# Patient Record
Sex: FEMALE | Race: WHITE | Employment: OTHER | URBAN - METROPOLITAN AREA
[De-identification: names, ages, dates, MRNs, and addresses within clinical notes are randomized per-mention and may not be internally consistent; named-entity substitution may affect disease eponyms.]

---

## 2022-01-05 ENCOUNTER — APPOINTMENT (OUTPATIENT)
Dept: GENERAL RADIOLOGY | Age: 77
End: 2022-01-05
Attending: EMERGENCY MEDICINE
Payer: MEDICARE

## 2022-01-05 ENCOUNTER — APPOINTMENT (OUTPATIENT)
Dept: CT IMAGING | Age: 77
End: 2022-01-05
Attending: EMERGENCY MEDICINE
Payer: MEDICARE

## 2022-01-05 ENCOUNTER — HOSPITAL ENCOUNTER (EMERGENCY)
Age: 77
Discharge: HOME | End: 2022-01-05
Attending: EMERGENCY MEDICINE
Payer: MEDICARE

## 2022-01-05 VITALS
SYSTOLIC BLOOD PRESSURE: 157 MMHG | HEART RATE: 88 BPM | OXYGEN SATURATION: 97 % | DIASTOLIC BLOOD PRESSURE: 70 MMHG | TEMPERATURE: 98 F | WEIGHT: 92.59 LBS | RESPIRATION RATE: 14 BRPM

## 2022-01-05 DIAGNOSIS — S32.9XXA CLOSED NONDISPLACED FRACTURE OF PELVIS, UNSPECIFIED PART OF PELVIS, INITIAL ENCOUNTER (HCC): Primary | ICD-10-CM

## 2022-01-05 PROCEDURE — 73502 X-RAY EXAM HIP UNI 2-3 VIEWS: CPT

## 2022-01-05 PROCEDURE — 72192 CT PELVIS W/O DYE: CPT

## 2022-01-05 PROCEDURE — 99282 EMERGENCY DEPT VISIT SF MDM: CPT

## 2022-01-05 NOTE — ED TRIAGE NOTES
Pt arrives via wheelchair, reports she fell in front of washing machine. Pt does not recall if she tripped. Pt reports left hip pain. Unable to walk.

## 2022-01-05 NOTE — ED PROVIDER NOTES
HPI the patient fell earlier today and landed on her left hip. She complains of pain in the left hip and is unable to bear weight. She denies head/neck or trunk injury. No past medical history on file. No past surgical history on file. No family history on file. Social History     Socioeconomic History    Marital status: SINGLE     Spouse name: Not on file    Number of children: Not on file    Years of education: Not on file    Highest education level: Not on file   Occupational History    Not on file   Tobacco Use    Smoking status: Not on file    Smokeless tobacco: Not on file   Substance and Sexual Activity    Alcohol use: Not on file    Drug use: Not on file    Sexual activity: Not on file   Other Topics Concern    Not on file   Social History Narrative    Not on file     Social Determinants of Health     Financial Resource Strain:     Difficulty of Paying Living Expenses: Not on file   Food Insecurity:     Worried About Running Out of Food in the Last Year: Not on file    Luciana of Food in the Last Year: Not on file   Transportation Needs:     Lack of Transportation (Medical): Not on file    Lack of Transportation (Non-Medical):  Not on file   Physical Activity:     Days of Exercise per Week: Not on file    Minutes of Exercise per Session: Not on file   Stress:     Feeling of Stress : Not on file   Social Connections:     Frequency of Communication with Friends and Family: Not on file    Frequency of Social Gatherings with Friends and Family: Not on file    Attends Zoroastrianism Services: Not on file    Active Member of Clubs or Organizations: Not on file    Attends Club or Organization Meetings: Not on file    Marital Status: Not on file   Intimate Partner Violence:     Fear of Current or Ex-Partner: Not on file    Emotionally Abused: Not on file    Physically Abused: Not on file    Sexually Abused: Not on file   Housing Stability:     Unable to Pay for Housing in the Last Year: Not on file    Number of Places Lived in the Last Year: Not on file    Unstable Housing in the Last Year: Not on file         ALLERGIES: Patient has no known allergies. Review of Systems   Constitutional: Negative for fever. HENT: Negative for voice change. Eyes: Negative for visual disturbance. Respiratory: Negative for shortness of breath. Cardiovascular: Negative for chest pain. Gastrointestinal: Negative for abdominal pain. Genitourinary: Negative for difficulty urinating. Musculoskeletal: Positive for arthralgias. Neurological: Negative for headaches. Psychiatric/Behavioral: Negative for confusion. There were no vitals filed for this visit. Physical Exam  Constitutional:       General: She is not in acute distress. Appearance: She is well-developed. HENT:      Head: Normocephalic and atraumatic. Eyes:      Pupils: Pupils are equal, round, and reactive to light. Cardiovascular:      Rate and Rhythm: Normal rate. Heart sounds: No murmur heard. Pulmonary:      Effort: Pulmonary effort is normal.      Breath sounds: Normal breath sounds. Abdominal:      Palpations: Abdomen is soft. Tenderness: There is no abdominal tenderness. Musculoskeletal:      Cervical back: Normal range of motion. Comments: There is pain on range of motion of the left hip. The left leg is not shortened or externally rotated. Skin:     General: Skin is warm and dry. Capillary Refill: Capillary refill takes less than 2 seconds. Neurological:      Mental Status: She is alert. Psychiatric:         Behavior: Behavior normal.          MDM       Procedures    5:20 PM--CT shows a nondisplaced superior ramus fracture. The patient was able to stand with assistance. The family would like to take the patient home and there are people at home that can help with her ambulation.   The plan is to discharge the patient and have her follow-up with orthopedics in 3 to 4 days.

## 2022-01-17 ENCOUNTER — HOSPITAL ENCOUNTER (EMERGENCY)
Age: 77
Discharge: HOME | End: 2022-01-17
Attending: STUDENT IN AN ORGANIZED HEALTH CARE EDUCATION/TRAINING PROGRAM
Payer: MEDICARE

## 2022-01-17 VITALS
WEIGHT: 92 LBS | SYSTOLIC BLOOD PRESSURE: 119 MMHG | HEART RATE: 92 BPM | DIASTOLIC BLOOD PRESSURE: 61 MMHG | RESPIRATION RATE: 16 BRPM | OXYGEN SATURATION: 95 % | TEMPERATURE: 97.3 F

## 2022-01-17 DIAGNOSIS — S32.512A CLOSED FRACTURE OF SUPERIOR RAMUS OF LEFT PUBIS, INITIAL ENCOUNTER (HCC): Primary | ICD-10-CM

## 2022-01-17 PROCEDURE — 74011250637 HC RX REV CODE- 250/637: Performed by: STUDENT IN AN ORGANIZED HEALTH CARE EDUCATION/TRAINING PROGRAM

## 2022-01-17 PROCEDURE — 99282 EMERGENCY DEPT VISIT SF MDM: CPT

## 2022-01-17 RX ORDER — ACETAMINOPHEN AND CODEINE PHOSPHATE 300; 30 MG/1; MG/1
1 TABLET ORAL
Qty: 12 TABLET | Refills: 0 | Status: SHIPPED | OUTPATIENT
Start: 2022-01-17 | End: 2022-01-20

## 2022-01-17 RX ORDER — ACETAMINOPHEN AND CODEINE PHOSPHATE 300; 30 MG/1; MG/1
1 TABLET ORAL
Status: COMPLETED | OUTPATIENT
Start: 2022-01-17 | End: 2022-01-17

## 2022-01-17 RX ADMIN — ACETAMINOPHEN AND CODEINE PHOSPHATE 1 TABLET: 300; 30 TABLET ORAL at 17:14

## 2022-01-17 NOTE — ED TRIAGE NOTES
Patient presents with a caregiver who reports the patient fell on 1/5 and was evaluated at that time and diagnosed with a superior ramus fracture-     Caregiver reports they were unable to get an ortho appointment and the patient has ongoing pain so they present to the ED for another evaluation-

## 2022-01-17 NOTE — DISCHARGE INSTRUCTIONS
- I spoke with Dr. Jillian Russell today, the on call orthopedic surgeon. He confirmed that you should be able to schedule an appointment with Dr. Abdifatah Bergman (also at [address] [place] [address]) to be seen in the office. Please let the  know that they MUST schedule you for an urgent appointment and that this conversation occurred.   - Continue Tylenol with codeine as needed for pain  - Return for severe pain, any new or worsening symptoms

## 2022-01-17 NOTE — PROGRESS NOTES
CARE MANAGEMENT NOTE      CM was asked to see Pt re: concerns over Pt's inability to ambulate. Pt was seen at [facility] ER on 1/5/22 for closed fracture of superior ramus of left pubis. Pt was unable to be seen by Ortho and was told to bring Pt back to ER due to pain. CM met with Pt and her caregiver/friend, Cindy. Pt has dementia so Pt's interaction is limited. CM discussed [facility] services. Cindy is agreeable. There is no preference for [facility] agency. CM also asked if Pt had a w/c at home. Cindy states they do not but she has been using a rollator to get Pt around. Cindy interested in a skinny w/c to help get Pt in/out of bathroom. CM discussed transport chair. Cindy agrees that sounds what they needs. CM informed her this would be an out of pocket purchase. She is agreeable. CM directed her to [facility] or online order from [employer]. CM provided Cindy examples from [employer] for reference. Plan is for f/u with [facility]. CM also provided information for [facility] in [facility] if problem persists. Cindy voiced understanding. JOSE spoke with Luaren Montiel at [facility] re: referral. Lauren Montiel confirms they are able to accept (they service Pt's location and accept Pt's insurance). Referral sent to Lauren Montiel via [employer] that included medicals and orders. Lauren Montiel confirms SN will plan to open tomorrow and PT can open on Wednesday. JOSE met with Cindy again. Cindy is agreeable to [facility]. She reports that she has an MRI scheduled for tomorrow (9:30am) but she would be available after that.  All information added to AVS.    Provider updated.     _____________________________________  MICHELE Rivera - Care Management  1/17/2022   6:11 PM

## 2022-01-17 NOTE — ED PROVIDER NOTES
Chief Complaint   Patient presents with    Back Pain     History and review of systems limited by dementia. Additional history was obtained by the patient's family member. This is a 62-year-old female with dementia brought in by family for persistent right hip and right lower back pain following a ground level fall that occurred on 2021. She was seen at [facility] at the time and had a CT scan confirming a left superior pubic ramus fracture. She was able to bear weight at the time and wished to go home, declined any analgesics, however over the last several days has been in severe pain and unable to walk. Prior to the injury she was ambulatory without assistance. Leona Giffordos today and was told to come to the emergency department to be evaluated further as the practice was not able to see her. She has no other systemic complaints. Denies any chest pain, shortness of breath, or abdominal pain. Does not take any anticoagulants. Past Medical History:   Diagnosis Date    Dementia [facility])        Past Surgical History:   Procedure Laterality Date    HX APPENDECTOMY           No family history on file.     Social History     Socioeconomic History    Marital status: SINGLE     Spouse name: Not on file    Number of children: Not on file    Years of education: Not on file    Highest education level: Not on file   Occupational History    Not on file   Tobacco Use    Smoking status: Former Smoker     Quit date: 2021     Years since quittin.4    Smokeless tobacco: Never Used   Substance and Sexual Activity    Alcohol use: Never    Drug use: Never    Sexual activity: Not on file   Other Topics Concern    Not on file   Social History Narrative    Not on file     Social Determinants of Health     Financial Resource Strain:     Difficulty of Paying Living Expenses: Not on file   Food Insecurity:     Worried About [address] in the Last Year: Not on file    Luciana of Food in the Last Year: Not on file   Transportation Needs:     Lack of Transportation (Medical): Not on file    Lack of Transportation (Non-Medical): Not on file   Physical Activity:     Days of Exercise per Week: Not on file    Minutes of Exercise per Session: Not on file   Stress:     Feeling of Stress : Not on file   Social Connections:     Frequency of Communication with Friends and Family: Not on file    Frequency of Social Gatherings with Friends and Family: Not on file    Attends Judaism Services: Not on file    Active Member of [address] [place] [address] [employer] or Organizations: Not on file    Attends Club or Organization Meetings: Not on file    Marital Status: Not on file   Intimate Partner Violence:     Fear of Current or Ex-Partner: Not on file    Emotionally Abused: Not on file    Physically Abused: Not on file    Sexually Abused: Not on file   Housing Stability:     Unable to Pay for Housing in the Last Year: Not on file    Number of [place] in the Last Year: Not on file    Unstable Housing in the Last Year: Not on file         ALLERGIES: Patient has no known allergies. Review of Systems   Unable to perform ROS: Dementia   Musculoskeletal: Positive for back pain.        Vitals:    01/17/22 1628   BP: 119/61   Pulse: 92   Resp: 16   Temp: 97.3 °F (36.3 °C)   SpO2: 95%   Weight: 41.7 kg (92 lb)            Physical Exam  General:  Awake and alert, NAD  HEENT:  NC/AT, equal pupils, moist mucous membranes  Neck:   Normal inspection, full range of motion  Cardiac:  RRR, no murmurs  Respiratory:  Clear bilaterally, no wheezes, rales, rhonchi  Abdomen:  Soft and nontender, nondistended  Pelvis:  Stable, tender to palpation across the right inferior lumbar region (acknowledge the injury is in the left hip, but she is tender across the right side of her lower back)  Extremities: Warm and well perfused, no peripheral edema  Neuro:  Moving all extremities symmetrically without gross motor deficit  Skin:   No rashes or pallor    RESULTS  No results found for this or any previous visit (from the past 12 hour(s)). IMAGING  No results found. Procedures - none unless documented below    ED course: Pain improved after receiving Tylenol with codeine. Case management was able to secure a transport wheelchair and home PT. She lives with her friend who feels comfortable bringing her home and bringing her to her appointment with orthopedics. I discussed her case with orthopedics Edelmira Jasso) as she was told earlier today by the  that she could not get an appointment, and he confirmed that someone from the practice can see her in the office. Will discharge home, I invited her to return should she develop any new or worsening symptoms or have difficulty functioning at home.     Impression: Left superior pubic ramus fracture  Disposition: Discharge home

## 2023-11-15 ENCOUNTER — HOSPITAL ENCOUNTER (OUTPATIENT)
Facility: HOSPITAL | Age: 78
Discharge: HOME | End: 2023-11-18
Payer: COMMERCIAL

## 2023-11-15 VITALS — WEIGHT: 92 LBS | BODY MASS INDEX: 15.71 KG/M2 | HEIGHT: 64 IN

## 2023-11-15 DIAGNOSIS — N63.11 LUMP IN UPPER OUTER QUADRANT OF RIGHT BREAST: ICD-10-CM

## 2023-11-15 PROCEDURE — 76642 ULTRASOUND BREAST LIMITED: CPT

## 2023-11-15 PROCEDURE — G0279 TOMOSYNTHESIS, MAMMO: HCPCS

## 2023-12-04 ENCOUNTER — APPOINTMENT (OUTPATIENT)
Facility: HOSPITAL | Age: 78
End: 2023-12-04
Payer: COMMERCIAL

## 2023-12-04 ENCOUNTER — HOSPITAL ENCOUNTER (OUTPATIENT)
Facility: HOSPITAL | Age: 78
Discharge: HOME | End: 2023-12-07
Payer: COMMERCIAL

## 2023-12-04 DIAGNOSIS — N63.12 MASS OF UPPER INNER QUADRANT OF RIGHT BREAST: ICD-10-CM

## 2023-12-04 PROCEDURE — 88305 TISSUE EXAM BY PATHOLOGIST: CPT

## 2023-12-04 PROCEDURE — 19083 BX BREAST 1ST LESION US IMAG: CPT

## 2023-12-04 NOTE — PROGRESS NOTES
9: 29 a.m. Patient received for right breast ultrasound-guided biopsy/breast clip. Procedure explained to patient and her Power Barrett WOODRUFF, as well as risks associated with procedure. Discharge instructions briefly discussed. Patient (and Power Barrett WOODRUFF) expect to be contacted by phone with pathology results. 9:45 a.m. A time-out was performed with all participants present. 10:30 a.m. Patient tolerated procedure well with minimal bleeding. Pressure held for 5 minutes; a dressing was applied to area (gauze and tegaderm). Discharge instructions reviewed with patient's Power of  and caretaker and a copy given to them. Patient, along with her Power of  and caretaker expect to be contacted by phone with pathology results.

## 2023-12-07 NOTE — PROGRESS NOTES
Pathology results in and reviewed by Dr Trudy Cisneros. Recommends additional biopsy sampling. Patient caretaker aware. Appointment scheduled.

## 2023-12-28 ENCOUNTER — HOSPITAL ENCOUNTER (OUTPATIENT)
Facility: HOSPITAL | Age: 78
End: 2023-12-28
Payer: COMMERCIAL

## 2023-12-28 DIAGNOSIS — N63.12 MASS OF UPPER INNER QUADRANT OF RIGHT BREAST: ICD-10-CM

## 2023-12-28 PROCEDURE — 19083 BX BREAST 1ST LESION US IMAG: CPT

## 2023-12-28 PROCEDURE — 88305 TISSUE EXAM BY PATHOLOGIST: CPT

## 2023-12-28 PROCEDURE — 88360 TUMOR IMMUNOHISTOCHEM/MANUAL: CPT

## 2023-12-28 PROCEDURE — 77065 DX MAMMO INCL CAD UNI: CPT

## 2023-12-28 NOTE — PROGRESS NOTES
Patient received for right breast ultrasound biopsy. Procedure explained to patient as well as risks associated with procedure. Patient accompanied by friend Ms Gomez Rigoberto who is her power of . Post biopsy discharge instructions reviewed with patient . Patient expects to be contacted by phone with pathology results.

## 2023-12-28 NOTE — PROGRESS NOTES
Pathology results in and reviewed by Dr May and faxed to referrring physician, Uday Rodriges.  Patient's caretaker Tatyana Willis contacted with pathology results.  States patient's biopsy site healing well.  Breast talk appointment arranged as requested with Mely Main.  Patient's care taker is aware of appointment date time and location.

## 2023-12-28 NOTE — PROGRESS NOTES
Patient tolerated procedure well. Minimal bleeding noted. Pressure held to biopsy site for 5 minutes. Post biopsy discharge instructions reviewed with patient and patient family. Patient taken for post biopsy mammogram.  Dressing remained dry and intact. Ice pack applied over transparent dressing. Specimen obtained and picked up by . Patient given a copy of discharge instructions after verbal review. Patient expects to be contacted by phone with pathology results.

## 2024-01-08 PROBLEM — C50.911 BREAST CANCER, RIGHT (HCC): Status: ACTIVE | Noted: 2024-01-08

## 2024-01-09 ENCOUNTER — OFFICE VISIT (OUTPATIENT)
Age: 79
End: 2024-01-09
Payer: COMMERCIAL

## 2024-01-09 ENCOUNTER — SOCIAL WORK (OUTPATIENT)
Age: 79
End: 2024-01-09

## 2024-01-09 ENCOUNTER — TELEPHONE (OUTPATIENT)
Age: 79
End: 2024-01-09

## 2024-01-09 VITALS — WEIGHT: 92 LBS | HEIGHT: 64 IN | BODY MASS INDEX: 15.71 KG/M2

## 2024-01-09 DIAGNOSIS — C50.411 MALIGNANT NEOPLASM OF UPPER-OUTER QUADRANT OF RIGHT BREAST IN FEMALE, ESTROGEN RECEPTOR POSITIVE (HCC): Primary | ICD-10-CM

## 2024-01-09 DIAGNOSIS — Z17.0 MALIGNANT NEOPLASM OF UPPER-OUTER QUADRANT OF RIGHT BREAST IN FEMALE, ESTROGEN RECEPTOR POSITIVE (HCC): Primary | ICD-10-CM

## 2024-01-09 PROCEDURE — 99203 OFFICE O/P NEW LOW 30 MIN: CPT | Performed by: SURGERY

## 2024-01-09 PROCEDURE — 1123F ACP DISCUSS/DSCN MKR DOCD: CPT | Performed by: SURGERY

## 2024-01-09 PROCEDURE — 76642 ULTRASOUND BREAST LIMITED: CPT | Performed by: SURGERY

## 2024-01-09 RX ORDER — FOLIC ACID 1 MG/1
TABLET ORAL
COMMUNITY
Start: 2023-12-24

## 2024-01-09 RX ORDER — ATORVASTATIN CALCIUM 40 MG/1
TABLET, FILM COATED ORAL
COMMUNITY
Start: 2023-12-28

## 2024-01-09 RX ORDER — QUETIAPINE FUMARATE 25 MG/1
TABLET, FILM COATED ORAL
COMMUNITY
Start: 2023-12-24

## 2024-01-09 RX ORDER — LORAZEPAM 0.5 MG/1
TABLET ORAL
COMMUNITY
Start: 2023-11-15

## 2024-01-09 NOTE — PROGRESS NOTES
tablet  11/15/23  Yes Provider, Historical, MD   QUEtiapine (SEROQUEL) 25 MG tablet  12/24/23  Yes Provider, Historical, MD   sertraline (ZOLOFT) 50 MG tablet  12/24/23  Yes Provider, Historical, MD   atorvastatin (LIPITOR) 40 MG tablet  12/28/23  Yes Provider, Historical, MD      No Known Allergies                Review of Systems      Physical Exam  Cardiovascular:      Rate and Rhythm: Normal rate and regular rhythm.   Pulmonary:      Effort: Pulmonary effort is normal.      Breath sounds: Normal breath sounds.   Chest:   Breasts:     Right: Mass (5cm hard fixed mass UOQ 1/3) present. No swelling, skin change or tenderness.      Left: No swelling, mass, skin change or tenderness.       Lymphadenopathy:      Upper Body:      Right upper body: No axillary adenopathy.      Left upper body: No axillary adenopathy.   Neurological:      Mental Status: She is alert.      BREAST ULTRASOUND, Preop planning  Indication:preop planning  right Breast upper outer quadrant   Technique:  The area was scanned using a high-frequency linear-array near-field transducer  Findings: 3.5cm oval mass with small internal fluid collection and adjacent small mass.  1.2cm axillary node with prominent cortex  Impression: RIGHT breast cancer with axillary lymphadenopathy  Disposition:  Will schedule lumpectomy with sentinel lymph node biopsy     ASSESSMENT and PLAN   Diagnosis Orders   1. Malignant neoplasm of upper-outer quadrant of right breast in female, estrogen receptor positive (HCC)        I spent 30 minutes reviewing previous notes and test results, face to face with the patient discussing diagnosis and treatment options, and documenting today's visit.     RIGHT breast cancer.  Will schedule RIGHT lumpectomy and SLNB  Pt likely will not do adjuvant treatment

## 2024-01-09 NOTE — H&P (VIEW-ONLY)
HISTORY OF PRESENT ILLNESS  Madeline Banda is a 78 y.o. female     HPI NEW patient consult referred by  [facility] for RIGHT breast IDC.  Patient here to discuss removal of tumor.  Patient 1st noticed lump about 6 month ago.    This was her first mammogram.  No prior issues with breast. No pain.  Lives in assisted living.  Dementia.  Here with a friend.    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    Past Medical History:   Diagnosis Date    Dementia (HCC)      Past Surgical History:   Procedure Laterality Date    APPENDECTOMY      US BREAST BIOPSY W LOC DEVICE 1ST LESION RIGHT Right 2023    US BREAST BIOPSY W LOC DEVICE 1ST LESION RIGHT 2023 [facility] RAD MAMMO    US BREAST BIOPSY W LOC DEVICE 1ST LESION RIGHT Right 2023    US BREAST BIOPSY W LOC DEVICE 1ST LESION RIGHT 2023 [facility] RAD MAMMO      OB History          0    Para   0    Term   0            AB        Living             SAB        IAB        Ectopic        Molar        Multiple        Live Births   0          Obstetric Comments   Menarche ?, LMP ?, # of children 0, age of 1st delivery -, Hysterectomy/oophorectomy no/no, Breast bx yes, history of breast feeding no, BCP no, Hormone therapy no             No family history on file.  Social History     Tobacco Use    Smoking status: Former     Current packs/day: 0.00     Types: Cigarettes     Quit date: 2021     Years since quittin.4    Smokeless tobacco: Never   Substance Use Topics    Alcohol use: Never      Prior to Admission medications    Medication Sig Start Date End Date Taking? Authorizing Provider   folic acid (FOLVITE) 1 MG tablet  23  Yes Provider, Dionisio, MD   LORazepam (ATIVAN) 0.5 MG

## 2024-01-09 NOTE — PROGRESS NOTES
Social Work  Psychosocial Assessment    Reason for Assessment:      [] Social Work Referral [x] Initial Assessment [x] New Diagnosis [] Other    Diagnosis: Right breast IDC    Advance Care Planning: Some on file; conversation deferred.         1/9/2024    11:13 AM   Demographics   Marital Status Single       Sources of Information:    [x]Patient  [x]Family  [x]Staff  [x]Medical Record    Mental Status:    [x]Alert  []Lethargic  []Unresponsive   [] Unable to assess   Oriented to:  [x]Person  []Place  []Time  [x]Situation      Relationship Status:  [x]Single  []  []Significant Other/Life Partner  []  []  []    Living Circumstances:  []Lives Alone  []Family/Significant Other in Household  []Roommates  []Children in the Home  []Paid Caregivers  [x]Assisted Living Facility/Group Home  []Skilled Nursing Facility  []Homeless  []Incarcerated  []Environmental/Care Concerns  []Other:    Employment Status:  []Employed Full-time []Employed Part-time []Homemaker  [x] Retired [] Short-Term Disability [] Long-Term Disability  [] Unemployed   [] SSI   [] SSDI  [] Self-Employment    Barriers to Learning:    []Language  []Developmental  [x]Cognitive  []Altered Mental Status  []Visual/Hearing Impairment  []Unable to Read/Write  []Motivational  [] Challenges Understanding Medical Jargon []No Barriers Identified      Financial/Legal Concerns:    []Uninsured  []Limited Income/Resources  []Non-Citizen  []Food Insecurity [x]No Concerns Identified   []Other:    Scientology/Spiritual/Existential:  Does patient have any spiritual or Oriental orthodox beliefs? [] Yes [] No  Is the patient involved in a spiritual, olivier or Oriental orthodox community? [] Yes [] No  Patient expressing spiritual/existential angst? [] Yes [x] No  Notes:    Support System:    Identified Support Person/Group: Best friend, Nancy  [x]Strong  []Fair  []Limited    Coping with Illness:   [x]  Coping Well  [] Challenges Coping with Serious Illness []

## 2024-01-15 ENCOUNTER — TELEPHONE (OUTPATIENT)
Age: 79
End: 2024-01-15

## 2024-01-15 ENCOUNTER — PREP FOR PROCEDURE (OUTPATIENT)
Age: 79
End: 2024-01-15

## 2024-01-15 PROBLEM — C50.911 BREAST CANCER, RIGHT BREAST (HCC): Status: ACTIVE | Noted: 2024-01-15

## 2024-01-15 NOTE — TELEPHONE ENCOUNTER
Patient Surgery Information Sheet      Patient Name:  Madeline Banda  Surgery Date:  January 22, 2024    Type of Surgery:  RIGHT BREAST LUMPECTOMY AND RIGHT LYMPH NODE BIOPSY    Estimated arrival time 7:00 AM    Arrival time will be confirmed the afternoon before your surgery.    Pre-procedure: Not Applicable    Pre-Operative Testing Department will call to schedule pre-op testing appointment if needed before surgery    Hospital:  [facility]  Address:  [address] [place] [address]. [facility] [address]  Check in location:  Through the Main Entrance of [place], Take the elevator on the left side to the second floor on your left as you step out of the elevator    Correction- once on the second floor take a RIGHT out of the elevator.    Pre-Operative Instructions:  Will be given at the pre-op appointment.    Special Instructions if needed:     NPO (nothing by mouth) or drinking after midnight the night before Surgery  Patient may shower the morning of, do not use an lotion, deodorant, powders, perfumes or makeup  Patient will need  the morning of surgery     Surgery Scheduler:  Sylvia Zacarias

## 2024-01-16 ENCOUNTER — HOSPITAL ENCOUNTER (OUTPATIENT)
Facility: HOSPITAL | Age: 79
Discharge: HOME | End: 2024-01-19

## 2024-01-16 VITALS
OXYGEN SATURATION: 97 % | HEIGHT: 62 IN | TEMPERATURE: 97.1 F | SYSTOLIC BLOOD PRESSURE: 131 MMHG | RESPIRATION RATE: 16 BRPM | DIASTOLIC BLOOD PRESSURE: 60 MMHG | HEART RATE: 97 BPM | WEIGHT: 102.9 LBS | BODY MASS INDEX: 18.93 KG/M2

## 2024-01-16 RX ORDER — QUETIAPINE FUMARATE 25 MG/1
12.5 TABLET, FILM COATED ORAL DAILY PRN
COMMUNITY

## 2024-01-16 RX ORDER — ACETAMINOPHEN 325 MG/1
650 TABLET ORAL EVERY 6 HOURS PRN
COMMUNITY

## 2024-01-16 RX ORDER — BENZOCAINE/MENTHOL 6 MG-10 MG
LOZENGE MUCOUS MEMBRANE PRN
COMMUNITY

## 2024-01-16 RX ORDER — CETIRIZINE HYDROCHLORIDE 10 MG/1
10 TABLET ORAL
COMMUNITY

## 2024-01-16 NOTE — PERIOP NOTE
[facility]                   [address] [place] [address]   MAIN OR                                  [phone]   MAIN PRE OP                          [phone]                                                                                AMBULATORY PRE OP          [phone]  PRE-ADMISSION TESTING    [phone]   Surgery Date:  Monday 1/22/2024       Is surgery arrival time given by surgeon?  YES  NO  If “NO”, [facility] staff will call you between 3 and 7pm the day before your surgery with your arrival time. (If your surgery is on a Monday, we will call you the Friday before.)    Call [phone] after 7pm Monday-Friday if you did not receive this call.    INSTRUCTIONS BEFORE YOUR SURGERY   When You  Arrive Arrive at the 2nd Floor Admitting Desk on the day of your surgery  Have your insurance card, photo ID, and any copayment (if needed)   Food   and   Drink NO food or drink after midnight the night before surgery    This means NO water, gum, mints, coffee, juice, etc.  No alcohol (beer, wine, liquor) 24 hours before and after surgery   Medications to   TAKE   Morning of Surgery MEDICATIONS TO TAKE THE MORNING OF SURGERY WITH A SIP OF WATER:     Atorvastatin  Lorazepam if needed  Quetiapine if needed    Tylenol may be taken as needed.   Medications  To  STOP      7 days before surgery Non-Steroidal anti-inflammatory Drugs (NSAID's): for example, Ibuprofen (Advil, Motrin), Naproxen (Aleve)  Aspirin, if taking for pain   Herbal supplements, vitamins, and fish oil  Other:     Blood  Thinners If you take  Aspirin, Plavix, Coumadin, or any blood-thinning or anti-blood clot medicine, talk to the doctor who prescribed the medications for pre-operative instructions.   Bathing Clothing  Jewelry  Valuables     If you shower the morning of surgery, please do not apply anything to your skin (lotions, powders, deodorant, or makeup, especially mascara)  Follow Chlorhexidine

## 2024-01-17 ENCOUNTER — TELEPHONE (OUTPATIENT)
Age: 79
End: 2024-01-17

## 2024-01-17 NOTE — TELEPHONE ENCOUNTER
[facility] AUTH # FOR UPCOMING SURGERY IS  [id number]  GOOD FOR 30 DAYS, 01/22/2024 - 02/21/2024

## 2024-01-19 RX ORDER — ONDANSETRON 2 MG/ML
4 INJECTION INTRAMUSCULAR; INTRAVENOUS
Status: CANCELLED | OUTPATIENT
Start: 2024-01-19 | End: 2024-01-20

## 2024-01-19 RX ORDER — SODIUM CHLORIDE, SODIUM LACTATE, POTASSIUM CHLORIDE, CALCIUM CHLORIDE 600; 310; 30; 20 MG/100ML; MG/100ML; MG/100ML; MG/100ML
INJECTION, SOLUTION INTRAVENOUS CONTINUOUS
Status: CANCELLED | OUTPATIENT
Start: 2024-01-19

## 2024-01-19 RX ORDER — DIPHENHYDRAMINE HYDROCHLORIDE 50 MG/ML
12.5 INJECTION INTRAMUSCULAR; INTRAVENOUS
Status: CANCELLED | OUTPATIENT
Start: 2024-01-19 | End: 2024-01-20

## 2024-01-19 NOTE — PERIOP NOTE
Hello,     You are scheduled to have surgery tomorrow at [facility].     We would like for you to arrive at 0700  We are located on the second floor, [address]. You will check-in at the registration desk located outside the elevators on the second floor prior to proceeding to [address].  Remember nothing to eat or drink after midnight. If you need to take medications the morning of surgery, please take with a few sips of water.   Wear loose, comfortable clothing and leave all your jewelry at home.   You may bring your cell phone with you.  One family member will be allowed in the pre-op area once you are dressed and your IV has been started.   You will need someone to drive you home and be with you for 24 hours post-anesthesia.     We look forward to seeing you! Call [phone] for questions after hours and [phone] between 5:30AM and 6PM.     Thanks!    [facility] ASU PREOP TEAM

## 2024-01-22 ENCOUNTER — ANESTHESIA (OUTPATIENT)
Facility: HOSPITAL | Age: 79
End: 2024-01-22
Payer: MEDICARE

## 2024-01-22 ENCOUNTER — HOSPITAL ENCOUNTER (OUTPATIENT)
Facility: HOSPITAL | Age: 79
Setting detail: OUTPATIENT SURGERY
Discharge: HOME | End: 2024-01-22
Attending: SURGERY | Admitting: SURGERY
Payer: MEDICARE

## 2024-01-22 ENCOUNTER — ANESTHESIA EVENT (OUTPATIENT)
Facility: HOSPITAL | Age: 79
End: 2024-01-22
Payer: MEDICARE

## 2024-01-22 VITALS
DIASTOLIC BLOOD PRESSURE: 72 MMHG | HEIGHT: 60 IN | RESPIRATION RATE: 24 BRPM | HEART RATE: 84 BPM | OXYGEN SATURATION: 97 % | TEMPERATURE: 98.6 F | BODY MASS INDEX: 20 KG/M2 | SYSTOLIC BLOOD PRESSURE: 117 MMHG | WEIGHT: 101.85 LBS

## 2024-01-22 PROBLEM — C50.911 BREAST CANCER, RIGHT BREAST (HCC): Status: RESOLVED | Noted: 2024-01-15 | Resolved: 2024-01-22

## 2024-01-22 PROCEDURE — 3700000001 HC ADD 15 MINUTES (ANESTHESIA): Performed by: SURGERY

## 2024-01-22 PROCEDURE — 19301 PARTIAL MASTECTOMY: CPT | Performed by: SURGERY

## 2024-01-22 PROCEDURE — 3600000003 HC SURGERY LEVEL 3 BASE: Performed by: SURGERY

## 2024-01-22 PROCEDURE — 2709999900 HC NON-CHARGEABLE SUPPLY: Performed by: SURGERY

## 2024-01-22 PROCEDURE — 7100000010 HC PHASE II RECOVERY - FIRST 15 MIN: Performed by: SURGERY

## 2024-01-22 PROCEDURE — 2720000010 HC SURG SUPPLY STERILE: Performed by: SURGERY

## 2024-01-22 PROCEDURE — 2500000003 HC RX 250 WO HCPCS: Performed by: SURGERY

## 2024-01-22 PROCEDURE — 2580000003 HC RX 258: Performed by: STUDENT IN AN ORGANIZED HEALTH CARE EDUCATION/TRAINING PROGRAM

## 2024-01-22 PROCEDURE — 6360000002 HC RX W HCPCS: Performed by: NURSE ANESTHETIST, CERTIFIED REGISTERED

## 2024-01-22 PROCEDURE — 7100000011 HC PHASE II RECOVERY - ADDTL 15 MIN: Performed by: SURGERY

## 2024-01-22 PROCEDURE — 2500000003 HC RX 250 WO HCPCS: Performed by: NURSE ANESTHETIST, CERTIFIED REGISTERED

## 2024-01-22 PROCEDURE — 3600000013 HC SURGERY LEVEL 3 ADDTL 15MIN: Performed by: SURGERY

## 2024-01-22 PROCEDURE — 38525 BIOPSY/REMOVAL LYMPH NODES: CPT | Performed by: SURGERY

## 2024-01-22 PROCEDURE — 88307 TISSUE EXAM BY PATHOLOGIST: CPT

## 2024-01-22 PROCEDURE — 3700000000 HC ANESTHESIA ATTENDED CARE: Performed by: SURGERY

## 2024-01-22 RX ORDER — PROPOFOL 10 MG/ML
INJECTION, EMULSION INTRAVENOUS CONTINUOUS PRN
Status: DISCONTINUED | OUTPATIENT
Start: 2024-01-22 | End: 2024-01-22 | Stop reason: SDUPTHER

## 2024-01-22 RX ORDER — MIDAZOLAM HYDROCHLORIDE 2 MG/2ML
2 INJECTION, SOLUTION INTRAMUSCULAR; INTRAVENOUS
Status: DISCONTINUED | OUTPATIENT
Start: 2024-01-22 | End: 2024-01-22 | Stop reason: HOSPADM

## 2024-01-22 RX ORDER — DEXAMETHASONE SODIUM PHOSPHATE 4 MG/ML
INJECTION, SOLUTION INTRA-ARTICULAR; INTRALESIONAL; INTRAMUSCULAR; INTRAVENOUS; SOFT TISSUE PRN
Status: DISCONTINUED | OUTPATIENT
Start: 2024-01-22 | End: 2024-01-22 | Stop reason: SDUPTHER

## 2024-01-22 RX ORDER — FENTANYL CITRATE 50 UG/ML
100 INJECTION, SOLUTION INTRAMUSCULAR; INTRAVENOUS
Status: DISCONTINUED | OUTPATIENT
Start: 2024-01-22 | End: 2024-01-22 | Stop reason: HOSPADM

## 2024-01-22 RX ORDER — EPHEDRINE SULFATE/0.9% NACL/PF 50 MG/5 ML
SYRINGE (ML) INTRAVENOUS PRN
Status: DISCONTINUED | OUTPATIENT
Start: 2024-01-22 | End: 2024-01-22 | Stop reason: SDUPTHER

## 2024-01-22 RX ORDER — LIDOCAINE HYDROCHLORIDE 20 MG/ML
INJECTION, SOLUTION EPIDURAL; INFILTRATION; INTRACAUDAL; PERINEURAL PRN
Status: DISCONTINUED | OUTPATIENT
Start: 2024-01-22 | End: 2024-01-22 | Stop reason: SDUPTHER

## 2024-01-22 RX ORDER — ONDANSETRON 2 MG/ML
INJECTION INTRAMUSCULAR; INTRAVENOUS PRN
Status: DISCONTINUED | OUTPATIENT
Start: 2024-01-22 | End: 2024-01-22 | Stop reason: SDUPTHER

## 2024-01-22 RX ORDER — SODIUM CHLORIDE, SODIUM LACTATE, POTASSIUM CHLORIDE, CALCIUM CHLORIDE 600; 310; 30; 20 MG/100ML; MG/100ML; MG/100ML; MG/100ML
INJECTION, SOLUTION INTRAVENOUS CONTINUOUS
Status: DISCONTINUED | OUTPATIENT
Start: 2024-01-22 | End: 2024-01-22 | Stop reason: HOSPADM

## 2024-01-22 RX ORDER — BUPIVACAINE HYDROCHLORIDE AND EPINEPHRINE 5; 5 MG/ML; UG/ML
INJECTION, SOLUTION EPIDURAL; INTRACAUDAL; PERINEURAL PRN
Status: DISCONTINUED | OUTPATIENT
Start: 2024-01-22 | End: 2024-01-22 | Stop reason: HOSPADM

## 2024-01-22 RX ORDER — FENTANYL CITRATE 50 UG/ML
INJECTION, SOLUTION INTRAMUSCULAR; INTRAVENOUS PRN
Status: DISCONTINUED | OUTPATIENT
Start: 2024-01-22 | End: 2024-01-22 | Stop reason: SDUPTHER

## 2024-01-22 RX ORDER — LIDOCAINE HYDROCHLORIDE 10 MG/ML
1 INJECTION, SOLUTION EPIDURAL; INFILTRATION; INTRACAUDAL; PERINEURAL
Status: DISCONTINUED | OUTPATIENT
Start: 2024-01-22 | End: 2024-01-22 | Stop reason: HOSPADM

## 2024-01-22 RX ORDER — MIDAZOLAM HYDROCHLORIDE 1 MG/ML
INJECTION INTRAMUSCULAR; INTRAVENOUS PRN
Status: DISCONTINUED | OUTPATIENT
Start: 2024-01-22 | End: 2024-01-22 | Stop reason: SDUPTHER

## 2024-01-22 RX ORDER — PHENYLEPHRINE HCL IN 0.9% NACL 0.4MG/10ML
SYRINGE (ML) INTRAVENOUS PRN
Status: DISCONTINUED | OUTPATIENT
Start: 2024-01-22 | End: 2024-01-22 | Stop reason: SDUPTHER

## 2024-01-22 RX ADMIN — Medication 80 MCG: at 09:34

## 2024-01-22 RX ADMIN — Medication 160 MCG: at 09:40

## 2024-01-22 RX ADMIN — FENTANYL CITRATE 50 MCG: 50 INJECTION, SOLUTION INTRAMUSCULAR; INTRAVENOUS at 09:29

## 2024-01-22 RX ADMIN — Medication 15 MG: at 09:56

## 2024-01-22 RX ADMIN — FENTANYL CITRATE 25 MCG: 50 INJECTION, SOLUTION INTRAMUSCULAR; INTRAVENOUS at 09:25

## 2024-01-22 RX ADMIN — DEXAMETHASONE SODIUM PHOSPHATE 4 MG: 4 INJECTION, SOLUTION INTRAMUSCULAR; INTRAVENOUS at 09:11

## 2024-01-22 RX ADMIN — Medication 80 MCG: at 09:37

## 2024-01-22 RX ADMIN — MIDAZOLAM HYDROCHLORIDE 2 MG: 1 INJECTION, SOLUTION INTRAMUSCULAR; INTRAVENOUS at 09:06

## 2024-01-22 RX ADMIN — Medication 10 MG: at 09:19

## 2024-01-22 RX ADMIN — Medication 80 MCG: at 09:31

## 2024-01-22 RX ADMIN — PROPOFOL 150 MCG/KG/MIN: 10 INJECTION, EMULSION INTRAVENOUS at 09:11

## 2024-01-22 RX ADMIN — SODIUM CHLORIDE, POTASSIUM CHLORIDE, SODIUM LACTATE AND CALCIUM CHLORIDE: 600; 310; 30; 20 INJECTION, SOLUTION INTRAVENOUS at 07:04

## 2024-01-22 RX ADMIN — Medication 15 MG: at 09:48

## 2024-01-22 RX ADMIN — FENTANYL CITRATE 25 MCG: 50 INJECTION, SOLUTION INTRAMUSCULAR; INTRAVENOUS at 09:11

## 2024-01-22 RX ADMIN — LIDOCAINE HYDROCHLORIDE 50 MG: 20 INJECTION, SOLUTION EPIDURAL; INFILTRATION; INTRACAUDAL; PERINEURAL at 09:07

## 2024-01-22 RX ADMIN — Medication 10 MG: at 09:22

## 2024-01-22 RX ADMIN — ONDANSETRON HYDROCHLORIDE 4 MG: 2 SOLUTION INTRAMUSCULAR; INTRAVENOUS at 09:11

## 2024-01-22 ASSESSMENT — PAIN - FUNCTIONAL ASSESSMENT: PAIN_FUNCTIONAL_ASSESSMENT: NONE - DENIES PAIN

## 2024-01-22 NOTE — DISCHARGE INSTRUCTIONS
DISCHARGE INSTRUCTIONS FROM DR LATOYA MAIN    Activity:  No driving for 24 hours.  Tomorrow you may resume normal activities as tolerated.  No restrictions.  Wound care:  Okay to shower/bathe.  Remove steristrips in one week.  Swelling and bruising are normal.  Pain control: Ice pack to the breast 20 minutes/hour for the next few hours.  You may use a heating pad tomorrow if needed.  Take acetaminophen (Tylenol) or ibuprofen (Motrin, Advil) as needed.  If pain is not controlled with over-the-counter medication call me for a narcotic prescription.    Follow up:  I will call with results within one week.    If you have swelling and pain next week you should make an appointment to see me in the office.  Call [phone] to make the appointment.  Problems/questions:  Call me on my cell phone.  Dr Latoya Main.  [phone]        DISCHARGE SUMMARY from your Nurse    The following personal items collected during your admission are returned to you:   Dental Appliance:    Vision:    Hearing Aid:    Jewelry:    Clothing:    Other Valuables:    Valuables sent to safe:       PATIENT INSTRUCTIONS:    After general anesthesia or intravenous sedation, for 24 hours or while taking prescription Narcotics:  Limit your activities  Do not drive and operate hazardous machinery  Do not make important personal or business decisions  Do  not drink alcoholic beverages  If there is redness at the IV site you should apply a warm compress to the area.  If redness or soreness persist call your primary care physician.      These are general instructions for a healthy lifestyle:    *  Please give a list of your current medications to your Primary Care Provider.  *  Please update this list whenever your medications are discontinued, doses are      changed, or new medications (including over-the-counter products) are added.  *  Please carry medication information at all times in case of emergency situations.    No smoking / No tobacco

## 2024-01-22 NOTE — ANESTHESIA POSTPROCEDURE EVALUATION
Department of Anesthesiology  Postprocedure Note    Patient: Madeline Banda  MRN: [id number]  YOB: 1945  Date of evaluation: 1/22/2024    Procedure Summary       Date: 01/22/24 Room / Location: [facility] ASU OR  / [facility] AMBULATORY OR    Anesthesia Start: 0907 Anesthesia Stop: 1004    Procedure: RIGHT BREAST LUMPECTOMY AND RIGHT LYMPH NODE BIOPSY (Right: Breast) Diagnosis:       Breast cancer, right breast (HCC)      (Breast cancer, right breast (HCC) [C50.911])    Surgeons: Mely Main MD Responsible Provider: Damon Myers MD    Anesthesia Type: MAC ASA Status: 2            Anesthesia Type: MAC    Mariia Phase I: Mariia Score: 10    Mariia Phase II: Mariia Score: 10    Anesthesia Post Evaluation    No notable events documented.

## 2024-01-22 NOTE — OP NOTE
Operative Note    [facility]  [address] [address], [id number]     Patient: Madeline Banda  YOB: 1945  MRN: [id number]    Date of Procedure: 1/22/2024    Pre-Op Diagnosis Codes:     * Breast cancer, right breast (HCC) [C50.911], UOQ, ER+    Post-Op Diagnosis: Same       Procedure(s):  RIGHT BREAST LUMPECTOMY AND RIGHT LYMPH NODE BIOPSY    Surgeon(s):  Mely Main MD    Assistant:   Surgical Assistant: Michelle Lopez    Anesthesia: Monitor Anesthesia Care    Estimated Blood Loss (mL): Minimal    Complications: None    Specimens:   ID Type Source Tests Collected by Time Destination   1 : Right breast cancer, suture marking two long lateral, two short superior Tissue Breast SURGICAL PATHOLOGY Mely Main MD 1/22/2024 0933    2 : Right axillary sentinel node Tissue Axillary SURGICAL PATHOLOGY Mely Main MD 1/22/2024 0939        Implants:  Implant Name Type Inv. Item Serial No.  Lot No. LRB No. Used Action   IMPLANT RECORD       1 Implanted         Drains: * No LDAs found *    Findings: 1 enlarged axillary sentinel lymph node  [place] Node Biopsy for Breast Cancer - Right  Operation performed with curative intent. Yes   Tracer(s) used to identify sentinel nodes in the upfront surgery (non-neoadjuvant) setting (select all that apply). N/a   Tracer(s) used to identify sentinel nodes in the neoadjuvant setting (select all that apply). N/A   All nodes (colored or non-colored) present at the end of a dye-filled lymphatic channel were removed. N/a   All significantly radioactive nodes were removed. N/A   All palpably suspicious nodes were removed. Yes   Biopsy-proven positive nodes marked with clips prior to chemotherapy were identified and removed. N/A          Detailed Description of Procedure:   Pt was brought into the operating room and placed on the table in a supine position.  She was sedated with IV sedation.  The RIGHT breast and  was controlled with electrocautery and surgicel powder.  The breast tissue and axillary tissues were re-approximated with multiple 3-0 vicryl sutures.  The dermis was closed with interrupted 3-0 vicryl sutures and the skin was closed with running 4-0 monocryl suture.  The wound was dressed with steristrips.  The patient was awakened and taken to the recovery room.   Sponge, needle and instrument counts were reported to be correct.      Electronically signed by Mely Main MD on 1/22/2024 at 9:51 AM

## 2024-01-22 NOTE — ANESTHESIA PRE PROCEDURE
Department of Anesthesiology  Preprocedure Note       Name:  Madeline Banda   Age:  78 y.o.  :  1945                                          MRN:  [id number]         Date:  2024      Surgeon: Surgeon(s):  Mely Main MD    Procedure: Procedure(s):  RIGHT BREAST LUMPECTOMY AND RIGHT LYMPH NODE BIOPSY    Medications prior to admission:   Prior to Admission medications    Medication Sig Start Date End Date Taking? Authorizing Provider   cetirizine (ZYRTEC) 10 MG tablet Take 1 tablet by mouth nightly    Dionisio Garcia MD   QUEtiapine (SEROQUEL) 25 MG tablet Take 0.5 tablets by mouth daily as needed for Agitation    Dionisio Garcia MD   hydrocortisone 1 % cream Apply topically as needed    Dionisio Garcia MD   acetaminophen (TYLENOL) 325 MG tablet Take 2 tablets by mouth every 6 hours as needed for Pain    Dionisio Garcia MD   folic acid (FOLVITE) 1 MG tablet Take 1 tablet by mouth daily 23   Dionisio Garcia MD   LORazepam (ATIVAN) 0.5 MG tablet 2 tablets 2 times daily as needed. 11/15/23   Dionisio Garcia MD   QUEtiapine (SEROQUEL) 25 MG tablet Take 1 tablet by mouth nightly as needed for Agitation 23   Dionisio Garcia MD   sertraline (ZOLOFT) 50 MG tablet Take 1 tablet by mouth nightly 23   Dionisio Garcia MD   atorvastatin (LIPITOR) 40 MG tablet Take 1 tablet by mouth daily 23   Dionisio Garcia MD       Current medications:    Current Facility-Administered Medications   Medication Dose Route Frequency Provider Last Rate Last Admin    lidocaine PF 1 % injection 1 mL  1 mL IntraDERmal Once PRN Damon Myers MD        fentaNYL (SUBLIMAZE) injection 100 mcg  100 mcg IntraVENous Once PRN Damon Myers MD        lactated ringers IV soln infusion   IntraVENous Continuous Damon Myers  mL/hr at 24 0704 New Bag at 24 0704    midazolam PF (VERSED) injection 2 mg  2 mg IntraVENous Once PRN Lucia

## 2024-01-25 ENCOUNTER — TELEPHONE (OUTPATIENT)
Age: 79
End: 2024-01-25

## 2024-02-01 ENCOUNTER — OFFICE VISIT (OUTPATIENT)
Age: 79
End: 2024-02-01

## 2024-02-01 VITALS — WEIGHT: 101 LBS | BODY MASS INDEX: 19.83 KG/M2 | HEIGHT: 60 IN

## 2024-02-01 DIAGNOSIS — Z09 SURGERY FOLLOW-UP: Primary | ICD-10-CM

## 2024-02-01 PROCEDURE — 99024 POSTOP FOLLOW-UP VISIT: CPT | Performed by: SURGERY

## 2024-02-01 NOTE — PROGRESS NOTES
HISTORY OF PRESENT ILLNESS  Madeline Banda is a 78 y.o. female     HPI ESTABLISHED patient POD#9 RIGHT breast lumpectomy w/ slnbx. Pt  denies pain, or discomfort.     1/2024 RIGHT lumpectomy UOQ 43mm invasive ductal carcinoma, grade 2, 0/2 nodes, %, ID neg, Her2 neg, Ki 8%         Review of Systems      Physical Exam  Chest:   Breasts:     Right: Skin change (healing well) present. No tenderness.              ASPIRATION OF SEROMA  Indication : Seroma:  right  upper outer quadrant  Prep : Alcohol.   Guidance : Ultrasound guidance.    Yield :  85cc of serous fluid was aspirated with an 18 gauge needle.  Effect : Seroma completely aspirated.  Tolerance: Pt tolerated the procedure with no discomfort  Disposition:  Follow up in one week if swelling recurs   ASSESSMENT and PLAN   Diagnosis Orders   1. Surgery follow-up          Seroma aspirated  No adjuvant treatment  PRN follow up
